# Patient Record
Sex: FEMALE | Race: ASIAN | HISPANIC OR LATINO | Employment: OTHER | URBAN - METROPOLITAN AREA
[De-identification: names, ages, dates, MRNs, and addresses within clinical notes are randomized per-mention and may not be internally consistent; named-entity substitution may affect disease eponyms.]

---

## 2021-01-04 ENCOUNTER — OFFICE VISIT (OUTPATIENT)
Dept: MEDICAL GROUP | Facility: MEDICAL CENTER | Age: 41
End: 2021-01-04
Payer: COMMERCIAL

## 2021-01-04 VITALS
DIASTOLIC BLOOD PRESSURE: 62 MMHG | RESPIRATION RATE: 16 BRPM | HEART RATE: 64 BPM | TEMPERATURE: 97.6 F | OXYGEN SATURATION: 96 % | BODY MASS INDEX: 18.32 KG/M2 | SYSTOLIC BLOOD PRESSURE: 106 MMHG | WEIGHT: 114 LBS | HEIGHT: 66 IN

## 2021-01-04 DIAGNOSIS — Z13.29 SCREENING FOR ENDOCRINE, METABOLIC AND IMMUNITY DISORDER: ICD-10-CM

## 2021-01-04 DIAGNOSIS — Z13.6 SCREENING FOR CARDIOVASCULAR CONDITION: ICD-10-CM

## 2021-01-04 DIAGNOSIS — Z13.0 SCREENING FOR ENDOCRINE, METABOLIC AND IMMUNITY DISORDER: ICD-10-CM

## 2021-01-04 DIAGNOSIS — Z13.228 SCREENING FOR ENDOCRINE, METABOLIC AND IMMUNITY DISORDER: ICD-10-CM

## 2021-01-04 DIAGNOSIS — Z85.118 HX OF CANCER OF LUNG: ICD-10-CM

## 2021-01-04 PROCEDURE — 99202 OFFICE O/P NEW SF 15 MIN: CPT | Performed by: FAMILY MEDICINE

## 2021-01-04 SDOH — ECONOMIC STABILITY: INCOME INSECURITY: HOW HARD IS IT FOR YOU TO PAY FOR THE VERY BASICS LIKE FOOD, HOUSING, MEDICAL CARE, AND HEATING?: NOT HARD AT ALL

## 2021-01-04 SDOH — HEALTH STABILITY: PHYSICAL HEALTH: ON AVERAGE, HOW MANY DAYS PER WEEK DO YOU ENGAGE IN MODERATE TO STRENUOUS EXERCISE (LIKE A BRISK WALK)?: 1 DAY

## 2021-01-04 SDOH — ECONOMIC STABILITY: INCOME INSECURITY: IN THE LAST 12 MONTHS, WAS THERE A TIME WHEN YOU WERE NOT ABLE TO PAY THE MORTGAGE OR RENT ON TIME?: NO

## 2021-01-04 SDOH — ECONOMIC STABILITY: TRANSPORTATION INSECURITY
IN THE PAST 12 MONTHS, HAS LACK OF TRANSPORTATION KEPT YOU FROM MEETINGS, WORK, OR FROM GETTING THINGS NEEDED FOR DAILY LIVING?: NO

## 2021-01-04 SDOH — HEALTH STABILITY: MENTAL HEALTH
STRESS IS WHEN SOMEONE FEELS TENSE, NERVOUS, ANXIOUS, OR CAN'T SLEEP AT NIGHT BECAUSE THEIR MIND IS TROUBLED. HOW STRESSED ARE YOU?: NOT AT ALL

## 2021-01-04 SDOH — SOCIAL STABILITY: SOCIAL NETWORK
IN A TYPICAL WEEK, HOW MANY TIMES DO YOU TALK ON THE PHONE WITH FAMILY, FRIENDS, OR NEIGHBORS?: MORE THAN THREE TIMES A WEEK

## 2021-01-04 SDOH — ECONOMIC STABILITY: TRANSPORTATION INSECURITY
IN THE PAST 12 MONTHS, HAS THE LACK OF TRANSPORTATION KEPT YOU FROM MEDICAL APPOINTMENTS OR FROM GETTING MEDICATIONS?: NO

## 2021-01-04 SDOH — ECONOMIC STABILITY: HOUSING INSECURITY
IN THE LAST 12 MONTHS, WAS THERE A TIME WHEN YOU DID NOT HAVE A STEADY PLACE TO SLEEP OR SLEPT IN A SHELTER (INCLUDING NOW)?: NO

## 2021-01-04 SDOH — HEALTH STABILITY: PHYSICAL HEALTH: ON AVERAGE, HOW MANY MINUTES DO YOU ENGAGE IN EXERCISE AT THIS LEVEL?: 10 MIN

## 2021-01-04 SDOH — SOCIAL STABILITY: SOCIAL NETWORK
DO YOU BELONG TO ANY CLUBS OR ORGANIZATIONS SUCH AS CHURCH GROUPS UNIONS, FRATERNAL OR ATHLETIC GROUPS, OR SCHOOL GROUPS?: NO

## 2021-01-04 SDOH — ECONOMIC STABILITY: TRANSPORTATION INSECURITY
IN THE PAST 12 MONTHS, HAS LACK OF RELIABLE TRANSPORTATION KEPT YOU FROM MEDICAL APPOINTMENTS, MEETINGS, WORK OR FROM GETTING THINGS NEEDED FOR DAILY LIVING?: NO

## 2021-01-04 SDOH — SOCIAL STABILITY: SOCIAL NETWORK: HOW OFTEN DO YOU ATTEND CHURCH OR RELIGIOUS SERVICES?: NEVER

## 2021-01-04 SDOH — ECONOMIC STABILITY: FOOD INSECURITY: WITHIN THE PAST 12 MONTHS, THE FOOD YOU BOUGHT JUST DIDN'T LAST AND YOU DIDN'T HAVE MONEY TO GET MORE.: NEVER TRUE

## 2021-01-04 SDOH — SOCIAL STABILITY: SOCIAL NETWORK: ARE YOU MARRIED, WIDOWED, DIVORCED, SEPARATED, NEVER MARRIED, OR LIVING WITH A PARTNER?: MARRIED

## 2021-01-04 SDOH — HEALTH STABILITY: PHYSICAL HEALTH: ON AVERAGE, HOW MANY MINUTES DO YOU ENGAGE IN EXERCISE AT THIS LEVEL?: 10 MINUTES

## 2021-01-04 SDOH — ECONOMIC STABILITY: FOOD INSECURITY: WITHIN THE PAST 12 MONTHS, YOU WORRIED THAT YOUR FOOD WOULD RUN OUT BEFORE YOU GOT MONEY TO BUY MORE.: NEVER TRUE

## 2021-01-04 SDOH — HEALTH STABILITY: MENTAL HEALTH: HOW OFTEN DO YOU HAVE 6 OR MORE DRINKS ON ONE OCCASION?: NEVER

## 2021-01-04 SDOH — SOCIAL STABILITY: SOCIAL NETWORK: HOW OFTEN DO YOU GET TOGETHER WITH FRIENDS OR RELATIVES?: MORE THAN THREE TIMES A WEEK

## 2021-01-04 SDOH — HEALTH STABILITY: MENTAL HEALTH: HOW MANY STANDARD DRINKS CONTAINING ALCOHOL DO YOU HAVE ON A TYPICAL DAY?: 1 OR 2

## 2021-01-04 SDOH — HEALTH STABILITY: MENTAL HEALTH: HOW OFTEN DO YOU HAVE A DRINK CONTAINING ALCOHOL?: 2-3 TIMES A WEEK

## 2021-01-04 SDOH — ECONOMIC STABILITY: HOUSING INSECURITY: IN THE LAST 12 MONTHS, HOW MANY PLACES HAVE YOU LIVED?: 2

## 2021-01-04 SDOH — SOCIAL STABILITY: SOCIAL NETWORK: HOW OFTEN DO YOU ATTENT MEETINGS OF THE CLUB OR ORGANIZATION YOU BELONG TO?: NEVER

## 2021-01-04 ASSESSMENT — SOCIAL DETERMINANTS OF HEALTH (SDOH)
HOW OFTEN DO YOU HAVE A DRINK CONTAINING ALCOHOL: 2-3 TIMES A WEEK
HOW HARD IS IT FOR YOU TO PAY FOR THE VERY BASICS LIKE FOOD, HOUSING, MEDICAL CARE, AND HEATING?: NOT HARD AT ALL
WITHIN THE PAST 12 MONTHS, YOU WORRIED THAT YOUR FOOD WOULD RUN OUT BEFORE YOU GOT THE MONEY TO BUY MORE: NEVER TRUE
HOW MANY DRINKS CONTAINING ALCOHOL DO YOU HAVE ON A TYPICAL DAY WHEN YOU ARE DRINKING: 1 OR 2
HOW OFTEN DO YOU GET TOGETHER WITH FRIENDS OR RELATIVES?: MORE THAN THREE TIMES A WEEK
DO YOU BELONG TO ANY CLUBS OR ORGANIZATIONS SUCH AS CHURCH GROUPS UNIONS, FRATERNAL OR ATHLETIC GROUPS, OR SCHOOL GROUPS?: NO
IN A TYPICAL WEEK, HOW MANY TIMES DO YOU TALK ON THE PHONE WITH FAMILY, FRIENDS, OR NEIGHBORS?: MORE THAN THREE TIMES A WEEK
HOW OFTEN DO YOU HAVE SIX OR MORE DRINKS ON ONE OCCASION: NEVER
HOW OFTEN DO YOU ATTEND CHURCH OR RELIGIOUS SERVICES?: NEVER
WITHIN THE PAST 12 MONTHS, THE FOOD YOU BOUGHT JUST DIDN'T LAST AND YOU DIDN'T HAVE MONEY TO GET MORE: NEVER TRUE
HOW OFTEN DO YOU ATTENT MEETINGS OF THE CLUB OR ORGANIZATION YOU BELONG TO?: NEVER

## 2021-01-04 ASSESSMENT — PATIENT HEALTH QUESTIONNAIRE - PHQ9: CLINICAL INTERPRETATION OF PHQ2 SCORE: 0

## 2021-01-04 NOTE — PROGRESS NOTES
"cc:  Lung cancer    Subjective:     Elise Birmingham is a 40 y.o. female presenting to establish care.  She reports a history of lung cancer in 2019.  She underwent a right lower lobectomy 12/2019 in [place].  She was diagnosed after routine screening test in [place] showing pulmonary nodules.  She feels well.  Denies any weight loss, fevers, cough, shortness of breath.      Review of systems:  See above.  All other systems are reviewed and are negative    No current outpatient medications on file.    Allergies, past medical history, past surgical history, family history, social history reviewed and updated    Objective:     Vitals: /62   Pulse 64   Temp 36.4 °C (97.6 °F)   Resp 16   Ht 1.676 m (5' 6\")   Wt 51.7 kg (114 lb)   LMP 12/13/2020 (Approximate)   SpO2 96%   BMI 18.40 kg/m²   General: Alert, pleasant, NAD  HEENT: Normocephalic.   Heart: Regular rate and rhythm.  S1 and S2 normal.  No murmurs appreciated.  Respiratory: Normal respiratory effort.  Clear to auscultation bilaterally.  Abdomen: Non-distended, soft  Skin: Warm, dry, no rashes.  Musculoskeletal: Gait is normal.  Moves all extremities well.  Extremities: No leg edema.  Psych:  Affect/mood is normal, judgement is good, memory is intact, grooming is appropriate.    Assessment/Plan:     Elise was seen today for [facility].    Diagnoses and all orders for this visit:    Hx of cancer of lung  New problem, managed by pulmonology in [place].  She will let us know if she would like to establish care here in [place].    Screening for endocrine, metabolic and immunity disorder  -     CBC WITHOUT DIFFERENTIAL; Future  -     Comp Metabolic Panel; Future  -     TSH WITH REFLEX TO FT4; Future    Screening for cardiovascular condition  -     Lipid Profile; Future      Return in about 1 year (around 1/4/2022) for Preventive/annual physical.    "

## 2021-01-07 ENCOUNTER — HOSPITAL ENCOUNTER (OUTPATIENT)
Dept: LAB | Facility: MEDICAL CENTER | Age: 41
End: 2021-01-07
Attending: FAMILY MEDICINE
Payer: COMMERCIAL

## 2021-01-07 DIAGNOSIS — Z13.6 SCREENING FOR CARDIOVASCULAR CONDITION: ICD-10-CM

## 2021-01-07 DIAGNOSIS — Z13.228 SCREENING FOR ENDOCRINE, METABOLIC AND IMMUNITY DISORDER: ICD-10-CM

## 2021-01-07 DIAGNOSIS — Z13.29 SCREENING FOR ENDOCRINE, METABOLIC AND IMMUNITY DISORDER: ICD-10-CM

## 2021-01-07 DIAGNOSIS — Z13.0 SCREENING FOR ENDOCRINE, METABOLIC AND IMMUNITY DISORDER: ICD-10-CM

## 2021-01-07 LAB
ALBUMIN SERPL BCP-MCNC: 4.3 G/DL (ref 3.2–4.9)
ALBUMIN/GLOB SERPL: 1.6 G/DL
ALP SERPL-CCNC: 54 U/L (ref 30–99)
ALT SERPL-CCNC: 8 U/L (ref 2–50)
ANION GAP SERPL CALC-SCNC: 6 MMOL/L (ref 7–16)
AST SERPL-CCNC: 16 U/L (ref 12–45)
BILIRUB SERPL-MCNC: 0.4 MG/DL (ref 0.1–1.5)
BUN SERPL-MCNC: 13 MG/DL (ref 8–22)
CALCIUM SERPL-MCNC: 8.8 MG/DL (ref 8.5–10.5)
CHLORIDE SERPL-SCNC: 103 MMOL/L (ref 96–112)
CHOLEST SERPL-MCNC: 142 MG/DL (ref 100–199)
CO2 SERPL-SCNC: 25 MMOL/L (ref 20–33)
CREAT SERPL-MCNC: 0.58 MG/DL (ref 0.5–1.4)
ERYTHROCYTE [DISTWIDTH] IN BLOOD BY AUTOMATED COUNT: 49.1 FL (ref 35.9–50)
GLOBULIN SER CALC-MCNC: 2.7 G/DL (ref 1.9–3.5)
GLUCOSE SERPL-MCNC: 97 MG/DL (ref 65–99)
HCT VFR BLD AUTO: 42.5 % (ref 37–47)
HDLC SERPL-MCNC: 69 MG/DL
HGB BLD-MCNC: 14.1 G/DL (ref 12–16)
LDLC SERPL CALC-MCNC: 65 MG/DL
MCH RBC QN AUTO: 31.6 PG (ref 27–33)
MCHC RBC AUTO-ENTMCNC: 33.2 G/DL (ref 33.6–35)
MCV RBC AUTO: 95.3 FL (ref 81.4–97.8)
PLATELET # BLD AUTO: 177 K/UL (ref 164–446)
PMV BLD AUTO: 10.4 FL (ref 9–12.9)
POTASSIUM SERPL-SCNC: 4.7 MMOL/L (ref 3.6–5.5)
PROT SERPL-MCNC: 7 G/DL (ref 6–8.2)
RBC # BLD AUTO: 4.46 M/UL (ref 4.2–5.4)
SODIUM SERPL-SCNC: 134 MMOL/L (ref 135–145)
TRIGL SERPL-MCNC: 41 MG/DL (ref 0–149)
TSH SERPL DL<=0.005 MIU/L-ACNC: 1.54 UIU/ML (ref 0.38–5.33)
WBC # BLD AUTO: 4.1 K/UL (ref 4.8–10.8)

## 2021-01-07 PROCEDURE — 80053 COMPREHEN METABOLIC PANEL: CPT

## 2021-01-07 PROCEDURE — 85027 COMPLETE CBC AUTOMATED: CPT

## 2021-01-07 PROCEDURE — 36415 COLL VENOUS BLD VENIPUNCTURE: CPT

## 2021-01-07 PROCEDURE — 80061 LIPID PANEL: CPT

## 2021-01-07 PROCEDURE — 84443 ASSAY THYROID STIM HORMONE: CPT

## 2021-01-12 ENCOUNTER — PATIENT MESSAGE (OUTPATIENT)
Dept: MEDICAL GROUP | Facility: MEDICAL CENTER | Age: 41
End: 2021-01-12

## 2021-01-12 DIAGNOSIS — Z90.2 S/P LOBECTOMY OF LUNG: ICD-10-CM

## 2021-01-12 DIAGNOSIS — Z85.118 HX OF CANCER OF LUNG: ICD-10-CM

## 2021-05-03 ENCOUNTER — OFFICE VISIT (OUTPATIENT)
Dept: SLEEP MEDICINE | Facility: MEDICAL CENTER | Age: 41
End: 2021-05-03
Payer: COMMERCIAL

## 2021-05-03 VITALS
RESPIRATION RATE: 16 BRPM | TEMPERATURE: 97.5 F | SYSTOLIC BLOOD PRESSURE: 102 MMHG | HEIGHT: 66 IN | BODY MASS INDEX: 19.44 KG/M2 | HEART RATE: 81 BPM | WEIGHT: 121 LBS | DIASTOLIC BLOOD PRESSURE: 60 MMHG | OXYGEN SATURATION: 100 %

## 2021-05-03 DIAGNOSIS — Z85.118 HX OF CANCER OF LUNG: ICD-10-CM

## 2021-05-03 DIAGNOSIS — Z90.2 S/P LOBECTOMY OF LUNG: ICD-10-CM

## 2021-05-03 PROCEDURE — 99203 OFFICE O/P NEW LOW 30 MIN: CPT | Performed by: INTERNAL MEDICINE

## 2021-05-03 ASSESSMENT — ENCOUNTER SYMPTOMS
SINUS PAIN: 0
VOMITING: 0
FALLS: 0
SPEECH CHANGE: 0
CONSTIPATION: 0
PALPITATIONS: 0
PHOTOPHOBIA: 0
TREMORS: 0
ORTHOPNEA: 0
CHILLS: 0
CLAUDICATION: 0
DIARRHEA: 0
COUGH: 0
SORE THROAT: 0
WEAKNESS: 0
HEARTBURN: 0
STRIDOR: 0
NECK PAIN: 0
ABDOMINAL PAIN: 0
MYALGIAS: 0
PND: 0
HEADACHES: 0
WHEEZING: 0
EYE REDNESS: 0
DIZZINESS: 0
DEPRESSION: 0
NAUSEA: 0
EYE PAIN: 0
SHORTNESS OF BREATH: 0
DIAPHORESIS: 0
SPUTUM PRODUCTION: 0
FOCAL WEAKNESS: 0
BLURRED VISION: 0
WEIGHT LOSS: 0
BACK PAIN: 0
DOUBLE VISION: 0
FEVER: 0
HEMOPTYSIS: 0
EYE DISCHARGE: 0

## 2021-05-03 ASSESSMENT — FIBROSIS 4 INDEX: FIB4 SCORE: 1.25

## 2021-05-03 NOTE — PROGRESS NOTES
Chief Complaint   Patient presents with   • Establish Care     referral 1/4/21 SHERI Medina MD DX HX of lung cancer        HPI: This patient is a 40 y.o. 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  Patient has no past medical history.  She is a lifelong non-smoker.  She is originally from [place] and has worked between [employer] and the [employer] [facility] until October 2020 when she relocated to [place] with her .  She had routine annual checkup as part of her employee physical in 2014 when cavitary nodule was noted on chest x-ray.  This was followed up with a CT scan which confirmed cavitary nodule, solitary.  This was followed with annual CT chest until 2019 when imaging showed enlargement.  Physicians in [employer] recommended excisional biopsy and this was agreed upon by her physicians at [facility].  She underwent right lower lobectomy due to location and nature of tumor which prohibited wedge resection on December 17, 2019.  Pathology showed mucinous adenocarcinoma.  Follow-up CT from June 2020 was apparently clear and she was seen by providers at [facility] at that time.  She had another CT chest which I do not have the report of from December 2020 which again showed no evidence for recurrence.  Since relocating to [place] she would like to establish care with providers locally.  She has no shortness of breath, no cough, no chest pain.  No acute complaints today.    Past Medical History:   Diagnosis Date   • Lung cancer (HCC)     Stage IA Mucinous AdenoCa s/p RLL lobectomy 12/2019   • Ovarian cyst     x2       Social History     Socioeconomic History   • Marital status:      Spouse name: Not on file   • Number of children: Not on file   • Years of education: Not on file   • Highest education level: Master's degree (e.g., MA, MS, Solo, MEd, MSW, ALEKSANDRA)   Occupational History   • Not on file   Tobacco Use   •  Smoking status: Never Smoker   • Smokeless tobacco: Never Used   Substance and Sexual Activity   • Alcohol use: Yes     Alcohol/week: 2.4 oz     Types: 4 Standard drinks or equivalent per week   • Drug use: Never   • Sexual activity: Not on file   Other Topics Concern   • Not on file   Social History Narrative   • Not on file     Social Determinants of Health     Financial Resource Strain: Low Risk    • Difficulty of Paying Living Expenses: Not hard at all   Food Insecurity: No Food Insecurity   • Worried About Running Out of Food in the Last Year: Never true   • Ran Out of Food in the Last Year: Never true   Transportation Needs: No Transportation Needs   • Lack of Transportation (Medical): No   • Lack of Transportation (Non-Medical): No   Physical Activity: Insufficiently Active   • Days of Exercise per Week: 1 day   • Minutes of Exercise per Session: 10 min   Stress: No Stress Concern Present   • Feeling of Stress : Not at all   Social Connections: Somewhat Isolated   • Frequency of Communication with Friends and Family: More than three times a week   • Frequency of Social Gatherings with Friends and Family: More than three times a week   • Attends Yazdanism Services: Never   • Active Member of Clubs or Organizations: No   • Attends Club or Organization Meetings: Never   • Marital Status:    Intimate Partner Violence:    • Fear of Current or Ex-Partner:    • Emotionally Abused:    • Physically Abused:    • Sexually Abused:        Family History   Problem Relation Age of Onset   • Hypertension Mother    • No Known Problems Father        No current outpatient medications on file prior to visit.     No current facility-administered medications on file prior to visit.       Allergies: Patient has no known allergies.    ROS:   Review of Systems   Constitutional: Negative for chills, diaphoresis, fever, malaise/fatigue and weight loss.   HENT: Negative for congestion, ear discharge, ear pain, hearing loss,  "nosebleeds, sinus pain, sore throat and tinnitus.    Eyes: Negative for blurred vision, double vision, photophobia, pain, discharge and redness.   Respiratory: Negative for cough, hemoptysis, sputum production, shortness of breath, wheezing and stridor.    Cardiovascular: Negative for chest pain, palpitations, orthopnea, claudication, leg swelling and PND.   Gastrointestinal: Negative for abdominal pain, constipation, diarrhea, heartburn, nausea and vomiting.   Genitourinary: Negative for dysuria and urgency.   Musculoskeletal: Negative for back pain, falls, joint pain, myalgias and neck pain.   Skin: Negative for itching and rash.   Neurological: Negative for dizziness, tremors, speech change, focal weakness, weakness and headaches.   Endo/Heme/Allergies: Negative for environmental allergies.   Psychiatric/Behavioral: Negative for depression.       /60 (BP Location: Right arm, Patient Position: Sitting, BP Cuff Size: Adult)   Pulse 81   Temp 36.4 °C (97.5 °F) (Temporal)   Resp 16   Ht 1.676 m (5' 6\")   Wt 54.9 kg (121 lb)   SpO2 100%     Physical Exam:  Physical Exam  Vitals reviewed.   Constitutional:       General: She is not in acute distress.     Appearance: Normal appearance. She is well-developed and normal weight.   HENT:      Head: Normocephalic and atraumatic.      Right Ear: External ear normal.      Left Ear: External ear normal.      Nose: Nose normal. No congestion.      Mouth/Throat:      Mouth: Mucous membranes are moist.      Pharynx: Oropharynx is clear. No oropharyngeal exudate.   Eyes:      General: No scleral icterus.     Extraocular Movements: Extraocular movements intact.      Conjunctiva/sclera: Conjunctivae normal.      Pupils: Pupils are equal, round, and reactive to light.   Neck:      Vascular: No JVD.      Trachea: No tracheal deviation.   Cardiovascular:      Rate and Rhythm: Normal rate and regular rhythm.      Heart sounds: Normal heart sounds. No murmur. No friction rub. " No gallop.    Pulmonary:      Effort: Pulmonary effort is normal. No accessory muscle usage or respiratory distress.      Breath sounds: Normal breath sounds. No wheezing or rales.   Abdominal:      General: There is no distension.      Palpations: Abdomen is soft.      Tenderness: There is no abdominal tenderness.   Musculoskeletal:         General: No tenderness or deformity. Normal range of motion.      Cervical back: Normal range of motion and neck supple.      Right lower leg: No edema.      Left lower leg: No edema.   Lymphadenopathy:      Cervical: No cervical adenopathy.   Skin:     General: Skin is warm and dry.      Findings: No rash.      Nails: There is no clubbing.   Neurological:      Mental Status: She is alert and oriented to person, place, and time.      Cranial Nerves: No cranial nerve deficit.      Gait: Gait normal.   Psychiatric:         Mood and Affect: Mood normal.         Behavior: Behavior normal.         PFTs as reviewed by me personally: NA    Imaging as reviewed by me personally: As per HPI    Assessment:  1. Hx of cancer of lung  CT-CHEST (THORAX) W/O    CT-CHEST (THORAX) W/O   2. S/P lobectomy of lung  CT-CHEST (THORAX) W/O    CT-CHEST (THORAX) W/O       Plan:  1.  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  Per report no evidence of recurrence on recent imaging.  We are approaching the 2-year donato but will continue surveillance every 6 months starting in June and then again in December with repeat CT chest.  I will see her in December but of course contact her if there are any abnormal findings on CT from next month.  In the meantime she is tobacco free.  If imaging continues to show no recurrence in December we can go to annual surveillance imaging.  2.  See discussion above.  Return in about 7 months (around 12/3/2021) for ct chest in June and in December.

## 2021-06-03 ENCOUNTER — HOSPITAL ENCOUNTER (OUTPATIENT)
Dept: RADIOLOGY | Facility: MEDICAL CENTER | Age: 41
End: 2021-06-03
Attending: INTERNAL MEDICINE
Payer: COMMERCIAL

## 2021-06-03 DIAGNOSIS — Z85.118 HX OF CANCER OF LUNG: ICD-10-CM

## 2021-06-03 DIAGNOSIS — Z90.2 S/P LOBECTOMY OF LUNG: ICD-10-CM

## 2021-06-03 PROCEDURE — 71250 CT THORAX DX C-: CPT

## 2021-12-14 ENCOUNTER — OFFICE VISIT (OUTPATIENT)
Dept: MEDICAL GROUP | Facility: MEDICAL CENTER | Age: 41
End: 2021-12-14
Payer: COMMERCIAL

## 2021-12-14 VITALS
HEIGHT: 66 IN | WEIGHT: 119 LBS | HEART RATE: 64 BPM | OXYGEN SATURATION: 99 % | DIASTOLIC BLOOD PRESSURE: 68 MMHG | TEMPERATURE: 97.6 F | RESPIRATION RATE: 16 BRPM | SYSTOLIC BLOOD PRESSURE: 118 MMHG | BODY MASS INDEX: 19.13 KG/M2

## 2021-12-14 DIAGNOSIS — Z85.118 HX OF CANCER OF LUNG: ICD-10-CM

## 2021-12-14 DIAGNOSIS — Z00.00 WELL WOMAN EXAM (NO GYNECOLOGICAL EXAM): ICD-10-CM

## 2021-12-14 DIAGNOSIS — R39.15 URINARY URGENCY: ICD-10-CM

## 2021-12-14 DIAGNOSIS — Z23 NEED FOR VACCINATION: ICD-10-CM

## 2021-12-14 DIAGNOSIS — Z12.31 ENCOUNTER FOR SCREENING MAMMOGRAM FOR MALIGNANT NEOPLASM OF BREAST: ICD-10-CM

## 2021-12-14 DIAGNOSIS — Z13.6 SCREENING FOR CARDIOVASCULAR CONDITION: ICD-10-CM

## 2021-12-14 PROCEDURE — 90651 9VHPV VACCINE 2/3 DOSE IM: CPT | Performed by: FAMILY MEDICINE

## 2021-12-14 PROCEDURE — 99396 PREV VISIT EST AGE 40-64: CPT | Mod: 25 | Performed by: FAMILY MEDICINE

## 2021-12-14 PROCEDURE — 90471 IMMUNIZATION ADMIN: CPT | Performed by: FAMILY MEDICINE

## 2021-12-14 ASSESSMENT — FIBROSIS 4 INDEX: FIB4 SCORE: 1.28

## 2021-12-14 NOTE — PROGRESS NOTES
cc: well exam    Subjective:     Elise Birmingham is a 41 y.o. female presents for a routine preventive health exam.    Ob-Gyn/ History:    Last Pap Smear:  2019 normal, hpv-.  2016 ascus, hpv+  Gyn Surgery:  none.    Screening/Preventative Topics:  Advanced directive: not discussed   Osteoporosis Screen/ DEXA: n/a   PT/vit D for falls prevention: n/a   Cholesterol Screenin2021, ordered   Diabetes Screenin2021, ordered  AAA Screening: n/a   Aspirin Use: n/a    Diet: healthy   Exercise: discussed   Screen for depression: PHQ-2: 0   Substance Abuse: none  Safe in relationship.   Seat belts, bike helmet, gun safety discussed.  Sun protection used.    Cancer screening  Colorectal Cancer Screening: n/a     Lung Cancer Screening: n/a    Cervical Cancer Screening: due 2022    Breast Cancer Screening: ordered     Infectious disease screening  --STI Screening: n/a   --Practices safe sex.  --HIV Screening: n/a   --Syphilis Screening: n/a   --Hepatitis C Screening: n/a   --Latent TB Screening: n/a     Immunizations:   Influenza: 2021  HPV: first one given today  Hepatitis B: n/a   Tetanus: declined  Shingles: n/a  Pneumococcal: n/a      Review of systems:  Denies any weight loss, fevers, fatigue, vision changes, sore throat, swollen glands, rashes, shortness of breath, chest pain, numbness, nausea, vomiting, diarrhea, constipation, abdominal pain, dysuria, hematuria, joint pain, muscle weakness, depression    No current outpatient medications on file.    No Known Allergies    Past Medical History:   Diagnosis Date   • Lung cancer (HCC)     Stage IA Mucinous AdenoCa s/p RLL lobectomy 2019   • Ovarian cyst     x2     Past Surgical History:   Procedure Laterality Date   • LOBECTOMY Right 2019     Family History   Problem Relation Age of Onset   • Hypertension Mother    • No Known Problems Father      Social History     Socioeconomic History   • Marital status:      Spouse name: Not on file   • Number  of children: Not on file   • Years of education: Not on file   • Highest education level: Master's degree (e.g., MA, MS, Solo, MEd, MSW, ALEKSANDRA)   Occupational History   • Not on file   Tobacco Use   • Smoking status: Never Smoker   • Smokeless tobacco: Never Used   Vaping Use   • Vaping Use: Never used   Substance and Sexual Activity   • Alcohol use: Yes     Alcohol/week: 2.4 oz     Types: 4 Standard drinks or equivalent per week   • Drug use: Never   • Sexual activity: Not on file   Other Topics Concern   • Not on file   Social History Narrative   • Not on file     Social Determinants of Health     Financial Resource Strain: Low Risk    • Difficulty of Paying Living Expenses: Not hard at all   Food Insecurity: No Food Insecurity   • Worried About Running Out of Food in the Last Year: Never true   • Ran Out of Food in the Last Year: Never true   Transportation Needs: No Transportation Needs   • Lack of Transportation (Medical): No   • Lack of Transportation (Non-Medical): No   Physical Activity: Insufficiently Active   • Days of Exercise per Week: 1 day   • Minutes of Exercise per Session: 10 min   Stress: No Stress Concern Present   • Feeling of Stress : Not at all   Social Connections: Moderately Isolated   • Frequency of Communication with Friends and Family: More than three times a week   • Frequency of Social Gatherings with Friends and Family: More than three times a week   • Attends [employer] Services: Never   • Active Member of Clubs or Organizations: No   • Attends Club or Organization Meetings: Never   • Marital Status:    Intimate Partner Violence:    • Fear of Current or Ex-Partner: Not on file   • Emotionally Abused: Not on file   • Physically Abused: Not on file   • Sexually Abused: Not on file   Housing Stability: Low Risk    • Unable to Pay for Housing in the Last Year: No   • Number of Places Lived in the Last Year: 2   • Unstable Housing in the Last Year: No       Objective:     Vitals: BP  "118/68   Pulse 64   Temp 36.4 °C (97.6 °F)   Resp 16   Ht 1.676 m (5' 6\")   Wt 54 kg (119 lb)   SpO2 99%   BMI 19.21 kg/m²   General: Alert, pleasant, NAD  HEENT:  Normocephalic.  PERRL, EOMI, no icterus or pallor.  Conjunctivae and lids normal.  External ears normal. Tympanic membranes pearly, opaque.  Neck supple.  No thyromegaly or masses palpated. No cervical or supraclavicular lymphadenopathy.  Heart:  Regular rate and rhythm.  S1 and S2 normal.  No murmurs appreciated.  Respiratory:  Normal respiratory effort.  Clear to auscultation bilaterally.  Abdomen:  Bowel sounds present.  Non-distended, soft, non-tender, no guarding/rebound. No hepatosplenomegaly.  No hernias.  Skin:  Warm, dry, no rashes  Musculoskeletal:  Gait is normal.  Moves all extremities well.  Extremities: No leg edema.  Neurological: No tremors, sensation grossly intact, patellar and biceps reflexes 2+ symmetric, tone/strength normal  Psych:  Affect/mood is normal, judgement is good, memory is intact, grooming is appropriate.    Assessment/Plan:     Elise was seen today for follow-up.    Diagnoses and all orders for this visit:    Well woman exam (no gynecological exam)  Patient Counseling:  --Discussed moderation in sodium/caffeine intake, saturated fat and cholesterol, caloric balance, sufficient fresh fruits/vegetables, fiber, iron.  --Discussed brushing, flossing, and dental visits.   --Encouraged regular exercise.   --Discussed tobacco, alcohol, or other drug use; availability of treatment for abuse.   --Injury prevention: Discussed safety belts, safety helmets, smoke detector, etc.    Urinary urgency  She reports having urinary urgency and blood in the urine.  Received 2 courses of antibiotics from a teledoc.  We will recheck  -     URINALYSIS,CULTURE IF INDICATED; Future  -     Basic Metabolic Panel; Future    Hx of cancer of lung  Discussed scheduling the CT scan ordered by her pulmonologist.  -     Basic Metabolic Panel; " Future    Screening for cardiovascular condition  -     Lipid Profile; Future    Encounter for screening mammogram for malignant neoplasm of breast  -     MA-SCREENING MAMMO BILAT W/TOMOSYNTHESIS W/CAD; Future    Need for vaccination  -     9VHPV Vaccine 2-3 Dose IM        Preventive visit in 1 year, sooner as needed for any concerns.

## 2021-12-22 ENCOUNTER — HOSPITAL ENCOUNTER (OUTPATIENT)
Dept: RADIOLOGY | Facility: MEDICAL CENTER | Age: 41
End: 2021-12-22
Attending: FAMILY MEDICINE
Payer: COMMERCIAL

## 2021-12-22 DIAGNOSIS — Z12.31 ENCOUNTER FOR SCREENING MAMMOGRAM FOR MALIGNANT NEOPLASM OF BREAST: ICD-10-CM

## 2021-12-22 PROCEDURE — 77063 BREAST TOMOSYNTHESIS BI: CPT

## 2021-12-28 ENCOUNTER — HOSPITAL ENCOUNTER (OUTPATIENT)
Dept: LAB | Facility: MEDICAL CENTER | Age: 41
End: 2021-12-28
Attending: FAMILY MEDICINE
Payer: COMMERCIAL

## 2021-12-28 DIAGNOSIS — Z85.118 HX OF CANCER OF LUNG: ICD-10-CM

## 2021-12-28 DIAGNOSIS — Z13.6 SCREENING FOR CARDIOVASCULAR CONDITION: ICD-10-CM

## 2021-12-28 DIAGNOSIS — R39.15 URINARY URGENCY: ICD-10-CM

## 2021-12-28 LAB
ANION GAP SERPL CALC-SCNC: 12 MMOL/L (ref 7–16)
APPEARANCE UR: CLEAR
BACTERIA #/AREA URNS HPF: NEGATIVE /HPF
BILIRUB UR QL STRIP.AUTO: NEGATIVE
BUN SERPL-MCNC: 16 MG/DL (ref 8–22)
CALCIUM SERPL-MCNC: 9.4 MG/DL (ref 8.5–10.5)
CHLORIDE SERPL-SCNC: 104 MMOL/L (ref 96–112)
CHOLEST SERPL-MCNC: 167 MG/DL (ref 100–199)
CO2 SERPL-SCNC: 23 MMOL/L (ref 20–33)
COLOR UR: YELLOW
CREAT SERPL-MCNC: 0.62 MG/DL (ref 0.5–1.4)
EPI CELLS #/AREA URNS HPF: NORMAL /HPF
FASTING STATUS PATIENT QL REPORTED: NORMAL
GLUCOSE SERPL-MCNC: 87 MG/DL (ref 65–99)
GLUCOSE UR STRIP.AUTO-MCNC: NEGATIVE MG/DL
HDLC SERPL-MCNC: 78 MG/DL
HYALINE CASTS #/AREA URNS LPF: NORMAL /LPF
KETONES UR STRIP.AUTO-MCNC: NEGATIVE MG/DL
LDLC SERPL CALC-MCNC: 78 MG/DL
LEUKOCYTE ESTERASE UR QL STRIP.AUTO: ABNORMAL
MICRO URNS: ABNORMAL
NITRITE UR QL STRIP.AUTO: NEGATIVE
PH UR STRIP.AUTO: 6 [PH] (ref 5–8)
POTASSIUM SERPL-SCNC: 4.5 MMOL/L (ref 3.6–5.5)
PROT UR QL STRIP: NEGATIVE MG/DL
RBC # URNS HPF: NORMAL /HPF
RBC UR QL AUTO: NEGATIVE
SODIUM SERPL-SCNC: 139 MMOL/L (ref 135–145)
SP GR UR STRIP.AUTO: 1.01
TRIGL SERPL-MCNC: 56 MG/DL (ref 0–149)
UROBILINOGEN UR STRIP.AUTO-MCNC: 0.2 MG/DL
WBC #/AREA URNS HPF: NORMAL /HPF

## 2021-12-28 PROCEDURE — 81001 URINALYSIS AUTO W/SCOPE: CPT

## 2021-12-28 PROCEDURE — 80061 LIPID PANEL: CPT

## 2021-12-28 PROCEDURE — 80048 BASIC METABOLIC PNL TOTAL CA: CPT

## 2021-12-28 PROCEDURE — 36415 COLL VENOUS BLD VENIPUNCTURE: CPT

## 2022-01-10 ENCOUNTER — HOSPITAL ENCOUNTER (OUTPATIENT)
Dept: RADIOLOGY | Facility: MEDICAL CENTER | Age: 42
End: 2022-01-10
Payer: COMMERCIAL

## 2022-12-01 ENCOUNTER — HOSPITAL ENCOUNTER (OUTPATIENT)
Dept: LAB | Facility: MEDICAL CENTER | Age: 42
End: 2022-12-01
Attending: FAMILY MEDICINE
Payer: COMMERCIAL

## 2022-12-01 ENCOUNTER — OFFICE VISIT (OUTPATIENT)
Dept: MEDICAL GROUP | Facility: MEDICAL CENTER | Age: 42
End: 2022-12-01
Payer: COMMERCIAL

## 2022-12-01 ENCOUNTER — HOSPITAL ENCOUNTER (OUTPATIENT)
Facility: MEDICAL CENTER | Age: 42
End: 2022-12-01
Attending: FAMILY MEDICINE
Payer: COMMERCIAL

## 2022-12-01 VITALS
WEIGHT: 115 LBS | HEART RATE: 74 BPM | TEMPERATURE: 97.6 F | BODY MASS INDEX: 18.48 KG/M2 | OXYGEN SATURATION: 98 % | HEIGHT: 66 IN | DIASTOLIC BLOOD PRESSURE: 68 MMHG | RESPIRATION RATE: 16 BRPM | SYSTOLIC BLOOD PRESSURE: 114 MMHG

## 2022-12-01 DIAGNOSIS — Z13.6 SCREENING FOR CARDIOVASCULAR CONDITION: ICD-10-CM

## 2022-12-01 DIAGNOSIS — Z90.2 S/P LOBECTOMY OF LUNG: ICD-10-CM

## 2022-12-01 DIAGNOSIS — Z01.419 WELL FEMALE EXAM WITH ROUTINE GYNECOLOGICAL EXAM: ICD-10-CM

## 2022-12-01 DIAGNOSIS — Z11.51 SCREENING FOR HPV (HUMAN PAPILLOMAVIRUS): ICD-10-CM

## 2022-12-01 DIAGNOSIS — Z85.118 HX OF CANCER OF LUNG: ICD-10-CM

## 2022-12-01 DIAGNOSIS — Z12.31 ENCOUNTER FOR SCREENING MAMMOGRAM FOR MALIGNANT NEOPLASM OF BREAST: ICD-10-CM

## 2022-12-01 DIAGNOSIS — Z23 NEED FOR VACCINATION: ICD-10-CM

## 2022-12-01 DIAGNOSIS — K64.4 EXTERNAL HEMORRHOID: ICD-10-CM

## 2022-12-01 DIAGNOSIS — Z11.59 NEED FOR HEPATITIS C SCREENING TEST: ICD-10-CM

## 2022-12-01 DIAGNOSIS — Z12.4 SCREENING FOR CERVICAL CANCER: ICD-10-CM

## 2022-12-01 LAB
ANION GAP SERPL CALC-SCNC: 9 MMOL/L (ref 7–16)
BUN SERPL-MCNC: 10 MG/DL (ref 8–22)
CALCIUM SERPL-MCNC: 9.3 MG/DL (ref 8.5–10.5)
CHLORIDE SERPL-SCNC: 107 MMOL/L (ref 96–112)
CHOLEST SERPL-MCNC: 172 MG/DL (ref 100–199)
CO2 SERPL-SCNC: 23 MMOL/L (ref 20–33)
CREAT SERPL-MCNC: 0.55 MG/DL (ref 0.5–1.4)
FASTING STATUS PATIENT QL REPORTED: NORMAL
GFR SERPLBLD CREATININE-BSD FMLA CKD-EPI: 118 ML/MIN/1.73 M 2
GLUCOSE SERPL-MCNC: 86 MG/DL (ref 65–99)
HCV AB SER QL: NORMAL
HDLC SERPL-MCNC: 77 MG/DL
LDLC SERPL CALC-MCNC: 86 MG/DL
POTASSIUM SERPL-SCNC: 4.1 MMOL/L (ref 3.6–5.5)
SODIUM SERPL-SCNC: 139 MMOL/L (ref 135–145)
TRIGL SERPL-MCNC: 45 MG/DL (ref 0–149)

## 2022-12-01 PROCEDURE — 99396 PREV VISIT EST AGE 40-64: CPT | Mod: 25 | Performed by: FAMILY MEDICINE

## 2022-12-01 PROCEDURE — 86803 HEPATITIS C AB TEST: CPT

## 2022-12-01 PROCEDURE — 88175 CYTOPATH C/V AUTO FLUID REDO: CPT

## 2022-12-01 PROCEDURE — 90651 9VHPV VACCINE 2/3 DOSE IM: CPT | Performed by: FAMILY MEDICINE

## 2022-12-01 PROCEDURE — 90472 IMMUNIZATION ADMIN EACH ADD: CPT | Performed by: FAMILY MEDICINE

## 2022-12-01 PROCEDURE — 80061 LIPID PANEL: CPT

## 2022-12-01 PROCEDURE — 90746 HEPB VACCINE 3 DOSE ADULT IM: CPT | Performed by: FAMILY MEDICINE

## 2022-12-01 PROCEDURE — 36415 COLL VENOUS BLD VENIPUNCTURE: CPT

## 2022-12-01 PROCEDURE — 87624 HPV HI-RISK TYP POOLED RSLT: CPT

## 2022-12-01 PROCEDURE — 80048 BASIC METABOLIC PNL TOTAL CA: CPT

## 2022-12-01 PROCEDURE — 90471 IMMUNIZATION ADMIN: CPT | Performed by: FAMILY MEDICINE

## 2022-12-01 ASSESSMENT — FIBROSIS 4 INDEX: FIB4 SCORE: 1.31

## 2022-12-01 ASSESSMENT — PATIENT HEALTH QUESTIONNAIRE - PHQ9: CLINICAL INTERPRETATION OF PHQ2 SCORE: 0

## 2022-12-01 NOTE — PROGRESS NOTES
cc: well exam    Subjective:     Elise Birmingham is a 42 y.o. female presents for a routine preventive health exam.    Ob-Gyn/ History:    /Para:    Last Pap Smear:   normal pap, hpv-.  2016 ascus, hpv+.   Gyn Surgery:  none.  Last menstrual period:  11/3.  Periods regular    Screening/Preventative Topics:  Advanced directive: not discussed   Osteoporosis Screen/ DEXA: n/a   PT/vit D for falls prevention: n/a   Cholesterol Screening: ordered   Diabetes Screening: ordered   AAA Screening: n/a   Aspirin Use: n/a    Diet: discussed   Exercise: discussed   Screen for depression: PHQ-2: 0   Substance Abuse: none     Cancer screening  Colorectal Cancer Screening: n/a     Lung Cancer Screening: n/a    Cervical Cancer Screening: collected today    Breast Cancer Screening: ordered     Infectious disease screening  --STI Screening: n/a   --Practices safe sex.  --HIV Screening: declined   --Syphilis Screening: n/a   --Hepatitis C Screening: ordered   --Latent TB Screening: n/a     Immunizations:   Influenza: up to date  HPV: 2nd dose given today  Hepatitis B: 1st dose given today   Tetanus: declined  Shingles: n/a  Pneumococcal: n/a      Review of systems:  Denies any weight loss, fevers, fatigue, vision changes, sore throat, swollen glands, rashes, shortness of breath, chest pain, numbness, nausea, vomiting, diarrhea, constipation, abdominal pain, dysuria, hematuria, joint pain, muscle weakness, depression.    No current outpatient medications on file.    No Known Allergies    Past Medical History:   Diagnosis Date    Lung cancer (HCC)     Stage IA Mucinous AdenoCa s/p RLL lobectomy 2019    Ovarian cyst     x2     Past Surgical History:   Procedure Laterality Date    LOBECTOMY Right 2019     Family History   Problem Relation Age of Onset    Hypertension Mother     No Known Problems Father      Social History     Socioeconomic History    Marital status:      Spouse name: Not on file    Number of  "children: Not on file    Years of education: Not on file    Highest education level: Master's degree (e.g., MA, MS, Solo, MEd, MSW, ALEKSANDRA)   Occupational History    Not on file   Tobacco Use    Smoking status: Never    Smokeless tobacco: Never   Vaping Use    Vaping Use: Never used   Substance and Sexual Activity    Alcohol use: Yes     Alcohol/week: 2.4 oz     Types: 4 Standard drinks or equivalent per week    Drug use: Never    Sexual activity: Not on file   Other Topics Concern    Not on file   Social History Narrative    Not on file     Social Determinants of Health     Financial Resource Strain: Not on file   Food Insecurity: Not on file   Transportation Needs: Not on file   Physical Activity: Not on file   Stress: Not on file   Social Connections: Not on file   Intimate Partner Violence: Not on file   Housing Stability: Not on file       Objective:     Vitals: /68   Pulse 74   Temp 36.4 °C (97.6 °F)   Resp 16   Ht 1.676 m (5' 6\")   Wt 52.2 kg (115 lb)   SpO2 98%   BMI 18.56 kg/m²   General: Alert, pleasant, NAD  HEENT:  Normocephalic.  PERRL, EOMI, no icterus or pallor.  Conjunctivae and lids normal.  External ears normal. Tympanic membranes pearly, opaque. Neck supple.  No thyromegaly or masses palpated. No cervical or supraclavicular lymphadenopathy.  Heart:  Regular rate and rhythm.  S1 and S2 normal.  No murmurs appreciated.  Respiratory:  Normal respiratory effort.  Clear to auscultation bilaterally.  Abdomen:  Bowel sounds present.  Non-distended, soft, non-tender, no guarding/rebound. No hepatosplenomegaly.  No hernias.  Pelvic exam: Normal external genitalia including urethral meatus.  Well supported, nontender urethra and bladder.  Vagina and cervix without significant discharge or lesions.  No cervical motion tenderness.  Anteflexed uterus of normal size, shape, and consistency.  No adnexal masses or tenderness.     Rectal:  Normal external anus, external hemorrhoid present.  Skin:  Warm, " dry, no rashes  Musculoskeletal:  Gait is normal.  Moves all extremities well.  Extremities: No leg edema.  Neurological: No tremors, sensation grossly intact, patellar and biceps reflexes 2+ symmetric, tone/strength normal  Psych:  Affect/mood is normal, judgement is good, memory is intact, grooming is appropriate.    A chaperone was offered to the patient during today's exam. Chaperone name: Sun was present.        Assessment/Plan:     Elise was seen today for follow-up.    Diagnoses and all orders for this visit:    Well female exam with routine gynecological exam  Screening for cervical cancer  Screening for HPV (human papillomavirus)  Patient Counseling:  --Discussed moderation in sodium/caffeine intake, saturated fat and cholesterol, caloric balance, sufficient fresh fruits/vegetables, fiber, iron  --Discussed brushing, flossing, and dental visits.   --Encouraged regular exercise.   --Discussed tobacco, alcohol, or other drug use; availability of treatment for abuse.   --Discussed sexually transmitted infections  -     THINPREP PAP WITH HPV; Future    Encounter for screening mammogram for malignant neoplasm of breast  -     MA-SCREENING MAMMO BILAT W/TOMOSYNTHESIS W/CAD; Future    Hx of cancer of lung  S/P lobectomy of lung  -     Basic Metabolic Panel; Future  -     CT-CHEST (THORAX) W/O; Future    Screening for cardiovascular condition  -     Lipid Profile; Future    Need for hepatitis C screening test  -     HEP C VIRUS ANTIBODY; Future    Need for vaccination  -     9VHPV Vaccine 2-3 Dose IM  -     Hepatitis B Vaccine Adult IM    External hemorrhoid  -     Referral to General Surgery      Preventive visit in 1 year, sooner as needed for any concerns.

## 2022-12-02 DIAGNOSIS — Z01.419 WELL FEMALE EXAM WITH ROUTINE GYNECOLOGICAL EXAM: ICD-10-CM

## 2022-12-02 DIAGNOSIS — Z11.51 SCREENING FOR HPV (HUMAN PAPILLOMAVIRUS): ICD-10-CM

## 2022-12-02 DIAGNOSIS — Z12.4 SCREENING FOR CERVICAL CANCER: ICD-10-CM

## 2022-12-04 LAB
CYTOLOGY REG CYTOL: ABNORMAL
HPV HR 12 DNA CVX QL NAA+PROBE: POSITIVE
HPV16 DNA SPEC QL NAA+PROBE: NEGATIVE
HPV18 DNA SPEC QL NAA+PROBE: NEGATIVE
SPECIMEN SOURCE: ABNORMAL

## 2022-12-08 DIAGNOSIS — R87.810 ASCUS WITH POSITIVE HIGH RISK HPV CERVICAL: ICD-10-CM

## 2022-12-08 DIAGNOSIS — R87.610 ASCUS WITH POSITIVE HIGH RISK HPV CERVICAL: ICD-10-CM

## 2022-12-19 ENCOUNTER — HOSPITAL ENCOUNTER (OUTPATIENT)
Dept: RADIOLOGY | Facility: MEDICAL CENTER | Age: 42
End: 2022-12-19
Attending: FAMILY MEDICINE
Payer: COMMERCIAL

## 2022-12-19 DIAGNOSIS — Z85.118 HX OF CANCER OF LUNG: ICD-10-CM

## 2022-12-19 DIAGNOSIS — Z90.2 S/P LOBECTOMY OF LUNG: ICD-10-CM

## 2022-12-19 PROCEDURE — 71250 CT THORAX DX C-: CPT

## 2022-12-23 ENCOUNTER — HOSPITAL ENCOUNTER (OUTPATIENT)
Dept: RADIOLOGY | Facility: MEDICAL CENTER | Age: 42
End: 2022-12-23
Attending: FAMILY MEDICINE
Payer: COMMERCIAL

## 2022-12-23 DIAGNOSIS — Z12.31 ENCOUNTER FOR SCREENING MAMMOGRAM FOR MALIGNANT NEOPLASM OF BREAST: ICD-10-CM

## 2022-12-23 PROCEDURE — 77063 BREAST TOMOSYNTHESIS BI: CPT

## 2023-08-02 ENCOUNTER — OFFICE VISIT (OUTPATIENT)
Dept: MEDICAL GROUP | Facility: MEDICAL CENTER | Age: 43
End: 2023-08-02
Payer: COMMERCIAL

## 2023-08-02 VITALS
OXYGEN SATURATION: 100 % | BODY MASS INDEX: 18.32 KG/M2 | WEIGHT: 114 LBS | SYSTOLIC BLOOD PRESSURE: 118 MMHG | HEIGHT: 66 IN | HEART RATE: 95 BPM | DIASTOLIC BLOOD PRESSURE: 72 MMHG | RESPIRATION RATE: 16 BRPM

## 2023-08-02 DIAGNOSIS — R87.810 ASCUS WITH POSITIVE HIGH RISK HPV CERVICAL: ICD-10-CM

## 2023-08-02 DIAGNOSIS — Z90.2 S/P LOBECTOMY OF LUNG: ICD-10-CM

## 2023-08-02 DIAGNOSIS — Z23 NEED FOR VACCINATION: ICD-10-CM

## 2023-08-02 DIAGNOSIS — R87.610 ASCUS WITH POSITIVE HIGH RISK HPV CERVICAL: ICD-10-CM

## 2023-08-02 DIAGNOSIS — Z85.118 HX OF CANCER OF LUNG: ICD-10-CM

## 2023-08-02 PROCEDURE — 90471 IMMUNIZATION ADMIN: CPT | Performed by: STUDENT IN AN ORGANIZED HEALTH CARE EDUCATION/TRAINING PROGRAM

## 2023-08-02 PROCEDURE — 90651 9VHPV VACCINE 2/3 DOSE IM: CPT | Performed by: STUDENT IN AN ORGANIZED HEALTH CARE EDUCATION/TRAINING PROGRAM

## 2023-08-02 PROCEDURE — 90472 IMMUNIZATION ADMIN EACH ADD: CPT | Performed by: STUDENT IN AN ORGANIZED HEALTH CARE EDUCATION/TRAINING PROGRAM

## 2023-08-02 PROCEDURE — 99214 OFFICE O/P EST MOD 30 MIN: CPT | Mod: 25 | Performed by: STUDENT IN AN ORGANIZED HEALTH CARE EDUCATION/TRAINING PROGRAM

## 2023-08-02 PROCEDURE — 3074F SYST BP LT 130 MM HG: CPT | Performed by: STUDENT IN AN ORGANIZED HEALTH CARE EDUCATION/TRAINING PROGRAM

## 2023-08-02 PROCEDURE — 3078F DIAST BP <80 MM HG: CPT | Performed by: STUDENT IN AN ORGANIZED HEALTH CARE EDUCATION/TRAINING PROGRAM

## 2023-08-02 PROCEDURE — 90746 HEPB VACCINE 3 DOSE ADULT IM: CPT | Performed by: STUDENT IN AN ORGANIZED HEALTH CARE EDUCATION/TRAINING PROGRAM

## 2023-08-02 ASSESSMENT — PATIENT HEALTH QUESTIONNAIRE - PHQ9: CLINICAL INTERPRETATION OF PHQ2 SCORE: 0

## 2023-08-02 NOTE — PROGRESS NOTES
"Subjective:     Chief Complaint   Patient presents with    Establish Care       HPI:   Elise presents today to establish care.  This is a previous patient of Dr. Medina.      Hx cavitary lung nodule   She reports a history of lung cancer in 2019.  She underwent a right lower lobectomy 2019 in [place].  She was diagnosed after routine screening test in [place] showing pulmonary nodules.  She feels well.  Denies any weight loss, fevers, cough, shortness of breath.  Continues to get yearly CT scans.    HPV positive   /Para:    Last Pap Smear, patient's last Pap smear 2022 was positive for HPV.  Patient has had history of positive HPV in the past.  Patient was recommended to follow-up with GYN.  Gyn Surgery:  none.    Screening/Preventative Topics:  Cholesterol Screenin2022 within normal limits.  Diabetes Screenin2022 normal fasting glucose.  Diet:   Exercise: hiking, golfing, 2x/week  Screen for depression: denies.   Substance Abuse: none       Immunizations:   Influenza: up to date  HPV: 3rd dose given today  Hepatitis B: 2nd dose given today   Tetanus: declined  Shingles: n/a  Pneumococcal: n/a        ROS:  Gen: no fevers/chills  Pulm: no sob, no cough  CV: no chest pain, no palpitations  GI: no nausea/vomiting, no diarrhea        Objective:     Exam:  /72 (BP Location: Right arm, Patient Position: Sitting, BP Cuff Size: Adult)   Pulse 95   Resp 16   Ht 1.676 m (5' 6\")   Wt 51.7 kg (114 lb)   SpO2 100%   BMI 18.40 kg/m²  Body mass index is 18.4 kg/m².    Gen: Alert and oriented, No apparent distress.  Neck: Neck is supple without lymphadenopathy.  Lungs: Normal effort, CTA bilaterally, no wheezes, rhonchi, or rales  CV: Regular rate and rhythm. No murmurs, rubs, or gallops.  Ext: No clubbing, cyanosis, edema.      Assessment & Plan:     42 y.o. female with the following -     1. S/P lobectomy of lung  - CT-CHEST (THORAX) W/O; Future    2. Hx of cancer of lung  Chronic, " stable.  Patient to continue yearly CT scans.  Patient advised not to place on until after December.  Patient denies any new symptoms today.  Patient continues to follow angina for yearly screenings  - CT-CHEST (THORAX) W/O; Future    3. ASCUS with positive high risk HPV cervical  Chronic, stable.  Discussed the patient importance of following up with GYN for further evaluation of ASCUS and positive HPV  - Referral to Gynecology    4. Need for vaccination  - Gardasil 9  - Hep B Adult 20+       No follow-ups on file.    Please note that this dictation was created using voice recognition software. I have made every reasonable attempt to correct obvious errors, but I expect that there are errors of grammar and possibly content that I did not discover before finalizing the note.

## 2024-01-16 ENCOUNTER — HOSPITAL ENCOUNTER (OUTPATIENT)
Facility: MEDICAL CENTER | Age: 44
End: 2024-01-16
Attending: OBSTETRICS & GYNECOLOGY
Payer: COMMERCIAL

## 2024-01-16 ENCOUNTER — GYNECOLOGY VISIT (OUTPATIENT)
Dept: OBGYN | Facility: CLINIC | Age: 44
End: 2024-01-16
Payer: COMMERCIAL

## 2024-01-16 VITALS
WEIGHT: 117 LBS | HEIGHT: 66 IN | SYSTOLIC BLOOD PRESSURE: 122 MMHG | BODY MASS INDEX: 18.8 KG/M2 | DIASTOLIC BLOOD PRESSURE: 79 MMHG

## 2024-01-16 DIAGNOSIS — Z87.42 HISTORY OF ABNORMAL CERVICAL PAP SMEAR: ICD-10-CM

## 2024-01-16 PROCEDURE — 88175 CYTOPATH C/V AUTO FLUID REDO: CPT

## 2024-01-16 PROCEDURE — 3078F DIAST BP <80 MM HG: CPT | Performed by: OBSTETRICS & GYNECOLOGY

## 2024-01-16 PROCEDURE — 87624 HPV HI-RISK TYP POOLED RSLT: CPT

## 2024-01-16 PROCEDURE — 99203 OFFICE O/P NEW LOW 30 MIN: CPT | Performed by: OBSTETRICS & GYNECOLOGY

## 2024-01-16 PROCEDURE — 3074F SYST BP LT 130 MM HG: CPT | Performed by: OBSTETRICS & GYNECOLOGY

## 2024-01-16 NOTE — PROGRESS NOTES
New Gynecological Visit    Elise Birmingham    43 y.o.    Chief complaint    Chief Complaint   Patient presents with    New Patient       HPI    Patient is a pleasant 44 yo G0 who presents as a referral from her PCP for an abnormal pap smear from December of 2022. Pap resulted as ASCUS, HPV positive on 12/1/2022. Patient also reports she did have an abnormal pap in 2021 but no further evaluation was done. She was referred to OBGYN at that time for a colposcopy however due to insurance issues was unable to get in. Her PCP recently placed another referral recently and thus presents today.   She has no current major concerns. Regular menses every 27 days, lasting 4 days. Normal flow.  Has history of lung cancer s/p lobectomy in 2019 but did not have to have further chemotherapy, radiation or immunosuppression.   Coitarche was age 24   # lifetime partners - 6; has 1 current male partner, sexually active.   No hx tobacco use  No hx STIs          Review of Systems:  Review of Systems   All other systems reviewed and are negative.       Past Obstetrical History:    G0    Past Gynecological History:    Last pap: 12/1/2022 ASCUS, HPV positive  H/o abnormal pap: Yes as above and in 2021  H/o STIs: No  DEXA: N/A  Last Mammogram: 12/23/22 BIRADS 1  LMP: Patient's last menstrual period was 01/12/2024 (exact date).  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     No current facility-administered medications for this visit.       Social  Social History     Tobacco Use    Smoking status: Never     "Smokeless tobacco: Never   Vaping Use    Vaping Use: Never used   Substance Use Topics    Alcohol use: Yes     Alcohol/week: 0.6 oz     Types: 1 Standard drinks or equivalent per week     Comment: 1 drink everyday    Drug use: Never        OBJECTIVE:    Vitals    /79 (BP Location: Right arm, Patient Position: Sitting, BP Cuff Size: Adult)   Ht 5' 6\"   Wt 117 lb   LMP 2024 (Exact Date)   BMI 18.88 kg/m²     Physical Exam    GENERAL: Well developed, well nourished, female in no acute distress.    HEENT: NCAT, mucus membranes moist    Neck: Supple, nontender, no HUA, no thyromegaly    CV: RRR    Pulm: CTAB    Abdomen: Soft ND NT.    Ext: DE DIOS    : Normal Vulva, vagina. No lesions present. No abnormal discharge. No blood.    Urethral meatus normal    Cervix smooth pink no lesions, abnormal discharge or blood. Pap collected    Bimanual exam deferred    Labs/Pathology:    23  Pap ASCUS, HPV positive    A/P    Patient Active Problem List   Diagnosis    Hx of cancer of lung    S/P lobectomy of lung    ASCUS with positive high risk HPV cervical       Elise Birmingham    43 y.o.        1. History of abnormal cervical Pap smear      Pap repeated today as it has been > 1 year since last pap. If indicated on today's result per ASCCP guidelines, we will proceed to colposcopy. If normal, routine pap screen schedule can resume. Patient in agreement with plan and appreciative of visit.     F/u pap.     RTC PRN.     Time spent: 20 minutes        Vidhi Lee M.D.    Obstetrics and Gynecology    :13 AM  "

## 2024-01-19 LAB
CYTOLOGIST CVX/VAG CYTO: NORMAL
CYTOLOGY CVX/VAG DOC CYTO: NORMAL
CYTOLOGY CVX/VAG DOC THIN PREP: NORMAL
HPV I/H RISK 4 DNA CVX QL PROBE+SIG AMP: NEGATIVE
NOTE NL11727A: NORMAL
OTHER STN SPEC: NORMAL
PATHOLOGIST CVX/VAG CYTO: NORMAL
STAT OF ADQ CVX/VAG CYTO-IMP: NORMAL

## 2024-01-23 ENCOUNTER — APPOINTMENT (OUTPATIENT)
Dept: MEDICAL GROUP | Facility: MEDICAL CENTER | Age: 44
End: 2024-01-23
Payer: COMMERCIAL

## 2024-10-10 ENCOUNTER — PATIENT MESSAGE (OUTPATIENT)
Dept: HEALTH INFORMATION MANAGEMENT | Facility: OTHER | Age: 44
End: 2024-10-10